# Patient Record
Sex: FEMALE | Race: WHITE | NOT HISPANIC OR LATINO | Employment: UNEMPLOYED | URBAN - NONMETROPOLITAN AREA
[De-identification: names, ages, dates, MRNs, and addresses within clinical notes are randomized per-mention and may not be internally consistent; named-entity substitution may affect disease eponyms.]

---

## 2018-05-14 ENCOUNTER — HOSPITAL ENCOUNTER (INPATIENT)
Facility: HOSPITAL | Age: 23
LOS: 3 days | Discharge: HOME | End: 2018-05-17
Attending: PSYCHIATRY & NEUROLOGY | Admitting: PSYCHIATRY & NEUROLOGY

## 2018-05-14 PROBLEM — R45.851 SUICIDAL IDEATION: Status: ACTIVE | Noted: 2018-05-14

## 2018-05-14 RX ORDER — PANTOPRAZOLE SODIUM 40 MG/1
40 TABLET, DELAYED RELEASE ORAL EVERY MORNING
Status: DISCONTINUED | OUTPATIENT
Start: 2018-05-15 | End: 2018-05-17 | Stop reason: HOSPADM

## 2018-05-14 RX ORDER — ACETAMINOPHEN 325 MG/1
650 TABLET ORAL EVERY 4 HOURS PRN
Status: DISCONTINUED | OUTPATIENT
Start: 2018-05-14 | End: 2018-05-17 | Stop reason: HOSPADM

## 2018-05-14 RX ORDER — CETIRIZINE HYDROCHLORIDE 10 MG/1
10 TABLET ORAL DAILY
Status: DISCONTINUED | OUTPATIENT
Start: 2018-05-14 | End: 2018-05-17 | Stop reason: HOSPADM

## 2018-05-14 RX ORDER — LOPERAMIDE HYDROCHLORIDE 2 MG/1
2 CAPSULE ORAL 4 TIMES DAILY PRN
Status: DISCONTINUED | OUTPATIENT
Start: 2018-05-14 | End: 2018-05-17 | Stop reason: HOSPADM

## 2018-05-14 RX ORDER — DOCUSATE SODIUM 100 MG/1
100 CAPSULE, LIQUID FILLED ORAL 2 TIMES DAILY PRN
Status: DISCONTINUED | OUTPATIENT
Start: 2018-05-14 | End: 2018-05-17 | Stop reason: HOSPADM

## 2018-05-14 RX ORDER — HYDROXYZINE PAMOATE 50 MG/1
50 CAPSULE ORAL EVERY 6 HOURS PRN
Status: DISCONTINUED | OUTPATIENT
Start: 2018-05-14 | End: 2018-05-17 | Stop reason: HOSPADM

## 2018-05-14 RX ORDER — CITALOPRAM 10 MG/1
10 TABLET ORAL DAILY
Status: ON HOLD | COMMUNITY
End: 2018-05-14

## 2018-05-14 RX ORDER — CLONIDINE HYDROCHLORIDE 0.1 MG/1
0.1 TABLET ORAL EVERY 4 HOURS PRN
Status: DISCONTINUED | OUTPATIENT
Start: 2018-05-14 | End: 2018-05-17 | Stop reason: HOSPADM

## 2018-05-14 RX ORDER — NICOTINE 21 MG/24HR
1 PATCH, TRANSDERMAL 24 HOURS TRANSDERMAL EVERY 24 HOURS
Status: DISCONTINUED | OUTPATIENT
Start: 2018-05-14 | End: 2018-05-16

## 2018-05-14 RX ORDER — TRAZODONE HYDROCHLORIDE 50 MG/1
50 TABLET ORAL NIGHTLY PRN
Status: DISCONTINUED | OUTPATIENT
Start: 2018-05-14 | End: 2018-05-15

## 2018-05-14 RX ORDER — LANSOPRAZOLE 15 MG/1
15 CAPSULE, DELAYED RELEASE ORAL DAILY
COMMUNITY
End: 2019-06-27

## 2018-05-14 RX ORDER — METRONIDAZOLE 500 MG/1
500 TABLET ORAL 3 TIMES DAILY
COMMUNITY
End: 2019-06-27

## 2018-05-14 RX ORDER — LORATADINE 10 MG/1
10 CAPSULE, LIQUID FILLED ORAL DAILY
COMMUNITY
End: 2019-06-27

## 2018-05-14 RX ORDER — HYDROXYZINE PAMOATE 25 MG/1
25 CAPSULE ORAL NIGHTLY
COMMUNITY
End: 2018-05-17 | Stop reason: HOSPADM

## 2018-05-14 RX ORDER — PROPRANOLOL HYDROCHLORIDE 10 MG/1
10 TABLET ORAL 2 TIMES DAILY
COMMUNITY
End: 2018-05-17 | Stop reason: HOSPADM

## 2018-05-14 RX ORDER — METRONIDAZOLE 500 MG/1
500 TABLET ORAL 3 TIMES DAILY
Status: DISCONTINUED | OUTPATIENT
Start: 2018-05-14 | End: 2018-05-17 | Stop reason: HOSPADM

## 2018-05-14 RX ORDER — METRONIDAZOLE 500 MG/1
500 TABLET ORAL 3 TIMES DAILY
Status: DISCONTINUED | OUTPATIENT
Start: 2018-05-14 | End: 2018-05-14

## 2018-05-14 RX ORDER — ONDANSETRON 4 MG/1
4 TABLET, FILM COATED ORAL EVERY 6 HOURS PRN
Status: DISCONTINUED | OUTPATIENT
Start: 2018-05-14 | End: 2018-05-17 | Stop reason: HOSPADM

## 2018-05-14 RX ADMIN — CETIRIZINE HYDROCHLORIDE 10 MG: 10 TABLET, FILM COATED ORAL at 18:10

## 2018-05-14 RX ADMIN — METRONIDAZOLE 500 MG: 500 TABLET ORAL at 20:22

## 2018-05-14 NOTE — NURSING NOTE
Patient was transferred from [facility] and stated that she had told the staff at  that she was being treated for C-diff.  Staff at [facility] was not given this information in report or in the paperwork received from them.

## 2018-05-14 NOTE — PLAN OF CARE
Problem: Suicidal Behavior (Adult)  Goal: Suicidal Behavior is Absent/Minimized/Managed  Outcome: Ongoing (interventions implemented as appropriate)      Problem: Mood Impairment (Depressive Signs/Symptoms) (Adult)  Goal: Improved Mood Symptoms (Depressive Signs/Symptoms)  Outcome: Ongoing (interventions implemented as appropriate)      Problem: Mood Impairment (Anxiety Signs/Symptoms) (Adult)  Goal: Improved Mood Symptoms (Anxiety Signs/Symptoms)  Outcome: Ongoing (interventions implemented as appropriate)      Problem: Patient Care Overview  Goal: Plan of Care Review  Outcome: Ongoing (interventions implemented as appropriate)    Goal: Individualization and Mutuality  Outcome: Ongoing (interventions implemented as appropriate)    Goal: Discharge Needs Assessment  Outcome: Ongoing (interventions implemented as appropriate)    Goal: Interprofessional Rounds/Family Conf  Outcome: Ongoing (interventions implemented as appropriate)      Problem: Overarching Goals (Adult)  Goal: Adheres to Safety Considerations for Self and Others  Outcome: Ongoing (interventions implemented as appropriate)    Goal: Optimized Coping Skills in Response to Life Stressors  Outcome: Ongoing (interventions implemented as appropriate)    Goal: Develops/Participates in Therapeutic [place] to Support Successful Transition  Outcome: Ongoing (interventions implemented as appropriate)

## 2018-05-14 NOTE — NURSING NOTE
Patient arrived via stretcher from [facility] escorted to exam room for initial admission process by security and staff at 1502.  Diagnosis of MDD and suicide attempt by OD of 20 Celexa.    Patient did receive charcoal.  She has flat but calm affect.  She states she continues to have suicidal thoughts with plan to OD.  She states that she has had a lot of medical issues, lost her job around November of last year, having financial problems and bought a house that needs a lot of fixing up.  Patient is depressed due to her life stressors and has felt very anxious on how to handle the problems in her life.  She stated that yesterday she was having chest pain due to the anxiety.  Patient also has other dx of C-Diff which she has been receiving treatment for and prescription should be completed tomorrow, anemia, IBS, and anemia.  Explained unit routine to patient.  Initiated care plans.  Will monitor as ordered to maintain patient safety.

## 2018-05-14 NOTE — NURSING NOTE
Patient informed to have home meds of Linzess and Birth control pill brought in.  Patient states her mom can bring these meds in.

## 2018-05-14 NOTE — NURSING NOTE
Patient educated regarding C-Diff precautions.  Patient will need to wash her hands with soap and water prior to leaving room and chose the same chair each time when going to groups and meals.  Patient notified of why staff would wear gowns and gloves in room beyond tape.  Patient verbalized understanding.

## 2018-05-14 NOTE — NURSING NOTE
Dr Dobson ROS         General  Recent weight change, Fatigue and headaches    Eyes   glasses/contact lens    ENT/Mouth   None    Cardio   Chest pain    Resp   None    GI    Appetite , Nausea/vomiting, Frequent diarrhea, Constipation and Heart burn       None    MS    None    Skin/Hair/Nails   None    Neuro   Frequent/recurrent headaches and Light headed/dizzy seizures

## 2018-05-15 PROBLEM — F10.21 ALCOHOL USE DISORDER, SEVERE, IN SUSTAINED REMISSION (HCC): Status: ACTIVE | Noted: 2018-05-15

## 2018-05-15 PROBLEM — F12.20 CANNABIS USE DISORDER, MODERATE, DEPENDENCE: Status: ACTIVE | Noted: 2018-05-15

## 2018-05-15 PROBLEM — F33.2 SEVERE EPISODE OF RECURRENT MAJOR DEPRESSIVE DISORDER, WITHOUT PSYCHOTIC FEATURES: Status: ACTIVE | Noted: 2018-05-15

## 2018-05-15 PROCEDURE — 90791 PSYCH DIAGNOSTIC EVALUATION: CPT | Performed by: PSYCHIATRY & NEUROLOGY

## 2018-05-15 PROCEDURE — 99232 SBSQ HOSP IP/OBS MODERATE 35: CPT | Performed by: FAMILY MEDICINE

## 2018-05-15 RX ORDER — DULOXETIN HYDROCHLORIDE 60 MG/1
60 CAPSULE, DELAYED RELEASE ORAL DAILY
Status: DISCONTINUED | OUTPATIENT
Start: 2018-05-15 | End: 2018-05-17 | Stop reason: HOSPADM

## 2018-05-15 RX ORDER — MIRTAZAPINE 15 MG/1
15 TABLET, FILM COATED ORAL NIGHTLY
Status: DISCONTINUED | OUTPATIENT
Start: 2018-05-15 | End: 2018-05-17 | Stop reason: HOSPADM

## 2018-05-15 RX ORDER — POTASSIUM CHLORIDE 750 MG/1
20 CAPSULE, EXTENDED RELEASE ORAL
Status: DISPENSED | OUTPATIENT
Start: 2018-05-15 | End: 2018-05-17

## 2018-05-15 RX ADMIN — MIRTAZAPINE 15 MG: 15 TABLET, FILM COATED ORAL at 20:01

## 2018-05-15 RX ADMIN — DULOXETINE 60 MG: 60 CAPSULE, DELAYED RELEASE ORAL at 13:21

## 2018-05-15 RX ADMIN — METRONIDAZOLE 500 MG: 500 TABLET ORAL at 20:01

## 2018-05-15 RX ADMIN — PANTOPRAZOLE SODIUM 40 MG: 40 TABLET, DELAYED RELEASE ORAL at 08:57

## 2018-05-15 RX ADMIN — POTASSIUM CHLORIDE 20 MEQ: 750 CAPSULE, EXTENDED RELEASE ORAL at 17:07

## 2018-05-15 RX ADMIN — METRONIDAZOLE 500 MG: 500 TABLET ORAL at 17:08

## 2018-05-15 RX ADMIN — CETIRIZINE HYDROCHLORIDE 10 MG: 10 TABLET, FILM COATED ORAL at 08:57

## 2018-05-15 RX ADMIN — METRONIDAZOLE 500 MG: 500 TABLET ORAL at 08:57

## 2018-05-15 RX ADMIN — POTASSIUM CHLORIDE 20 MEQ: 750 CAPSULE, EXTENDED RELEASE ORAL at 09:03

## 2018-05-15 NOTE — NURSING NOTE
Behavior   Anxiety: Feeling worried  Depression: depressed mood  Pain  4  AVH   no  S/I   no  H/I   no    Affect   calm and pleasant and flat    Note: patient is in her room, she is not c/o SI/HI.  She states is friendly. She states her throat and ear hurt.  She reports that she is not employed now due to missing so much work with the stomach problems.    She is still on precautions due to C-diff.        Intervention  Instructed in medication usage and effects  Medications administered as ordered  Encouraged to verbalize needs      Response  Verbalized understanding   Did patient take medications as ordered yes   Did patient interact with assessment?  yes     Plan  Will monitor for safety  Will monitor every 15 minutes as ordered  Will evaluate and promote the plan of care
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
## 2018-05-15 NOTE — NURSING NOTE
Behavior   Anxiety: Feeling anxious  Depression: depressed mood, suicidal thoughts with specific plan and loss of energy/fatigue  Pain   0  AVH   no  S/I   yes   H/I   no  Affect   Calm, flat    Patient was laying in bed awake during med pass. Patient had flat affect and appeared unkempt. Patient would not engage in conversation. Patient appeared irritated to speak with RN. Patient denies any physical discomfort or diarrhea. Snack provided, will continue to monitor.     Intervention  Medications reviewed and administered  Assessment complete    Response  Verbalized understanding   Took medications  Interacted with assessment    Plan  Will promote and reinforce current treatment plan and encourage involvement in care plan goals.   Will provide for safe, calm, quiet environment.  Will promote open communication with staff and foster a trusting/working relationship with patient.   Will promote participation in groups and therapies and independent reflection.

## 2018-05-15 NOTE — PLAN OF CARE
Problem: Suicidal Behavior (Adult)  Goal: Suicidal Behavior is Absent/Minimized/Managed  Outcome: Ongoing (interventions implemented as appropriate)      Problem: Mood Impairment (Depressive Signs/Symptoms) (Adult)  Goal: Improved Mood Symptoms (Depressive Signs/Symptoms)  Outcome: Ongoing (interventions implemented as appropriate)      Problem: Mood Impairment (Anxiety Signs/Symptoms) (Adult)  Goal: Improved Mood Symptoms (Anxiety Signs/Symptoms)  Outcome: Ongoing (interventions implemented as appropriate)      Problem: Patient Care Overview  Goal: Discharge Needs Assessment  Outcome: Ongoing (interventions implemented as appropriate)    Goal: Interprofessional Rounds/Family Conf  Outcome: Ongoing (interventions implemented as appropriate)      Problem: Overarching Goals (Adult)  Goal: Adheres to Safety Considerations for Self and Others  Outcome: Ongoing (interventions implemented as appropriate)    Goal: Optimized Coping Skills in Response to Life Stressors  Outcome: Ongoing (interventions implemented as appropriate)    Goal: Develops/Participates in Therapeutic [place] to Support Successful Transition  Outcome: Ongoing (interventions implemented as appropriate)
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
## 2018-05-15 NOTE — CONSULTS
CHIEF COMPLAINT/REASON FOR VISIT:  Suicide Attempt    HPI:  Patient presented to the [facility] emergency room on May 14 at apparently about 4 AM.  sHe reported she took 20 tablets of duloxetine 60 mg about 45 minutes prior to presentation and there was a suicide note left.  There are no other notes available to us after the initial intake in the emergency room but apparently she was felt to be medically stable and was transferred to the behavioral health unit here.    PROBLEM LIST:  Patient Active Problem List    Diagnosis   • Suicidal ideation [R45.851]         CURRENT MEDICATIONS:  Prescriptions Prior to Admission   Medication Sig Dispense Refill Last Dose   • hydrOXYzine (VISTARIL) 25 MG capsule Take 25 mg by mouth Every Night.   5/13/2018 at 2100   • lansoprazole (PREVACID) 15 MG capsule Take 15 mg by mouth Daily.   5/13/2018 at 0800   • linaclotide (LINZESS) 290 MCG capsule capsule Take 72 mcg by mouth Every Morning Before Breakfast.   5/13/2018 at 0800   • Loratadine (CLARITIN) 10 MG capsule Take 10 mg by mouth Daily.   5/13/2018 at 0800   • metroNIDAZOLE (FLAGYL) 500 MG tablet Take 500 mg by mouth 3 (Three) Times a Day.   5/13/2018 at 1400   • Norgestimate-Eth Estradiol (SPRINTEC 28 PO) Take  by mouth.   Past Week at 2100   • propranolol (INDERAL) 10 MG tablet Take 10 mg by mouth 2 (Two) Times a Day.   5/13/2018 at 0800   On the medication list as presenting to the emergency room she was on citalopram 20 mg daily    ALLERGIES:  Milk-related compounds; Dayquil [pseudoephedrine-apap-dm]; and Nyquil multi-symptom [pseudoeph-doxylamine-dm-apap]      PAST MEDICAL/SURGICAL HISTORY:  Past Medical History:   Diagnosis Date   • Anxiety    • Depression    • Suicide attempt    History of C. difficile with diarrhea resolved.  She says she has 2 more days remaining of her Flagyl oral treatment.   irritable bowel syndrome  Migraine  GERD    History reviewed. No pertinent surgical history.    Review of  Systems   Constitutional: Positive for fatigue. Negative for activity change, appetite change and fever.   HENT: Positive for ear pain and sore throat. Negative for congestion, ear discharge, facial swelling, hearing loss, nosebleeds, postnasal drip, rhinorrhea, sinus pressure, tinnitus and trouble swallowing.    Eyes: Negative for pain, discharge and visual disturbance.   Respiratory: Negative for cough, shortness of breath and wheezing.    Cardiovascular: Positive for chest pain. Negative for palpitations and leg swelling.   Gastrointestinal: Positive for abdominal pain, constipation and diarrhea. Negative for blood in stool, nausea and vomiting.   Genitourinary: Negative for difficulty urinating, dyspareunia, dysuria, flank pain, frequency, hematuria, menstrual problem, pelvic pain, urgency, vaginal bleeding and vaginal discharge.   Musculoskeletal: Negative for arthralgias, back pain, joint swelling, myalgias and neck pain.   Skin: Negative for rash and wound.   Neurological: Positive for headaches. Negative for dizziness, seizures, syncope, weakness and light-headedness.   Hematological: Negative for adenopathy.       Social History     Social History   • Marital status: Single     Spouse name: N/A   • Number of children: N/A   • Years of education: N/A     Occupational History   • Not on file.     Social History Main Topics   • Smoking status: Current Every Day Smoker     Start date: 4/14/2018   • Smokeless tobacco: Current User      Comment: past month only   • Alcohol use 0.6 oz/week     1 Shots of liquor per week      Comment: one time per month   • Drug use:      Types: Marijuana   • Sexual activity: Yes     Partners: Male     Birth control/ protection: Other      Comment: birth control pills     Other Topics Concern   • Not on file     Social History Narrative   • No narrative on file       Family History   Problem Relation Age of Onset   • Anxiety disorder Mother    • Drug abuse Father    • ADD / ADHD  "Brother    • ADD / ADHD Maternal Uncle    • Suicide Attempts Maternal Uncle    • ADD / ADHD Paternal Uncle    • Depression Maternal Grandfather    • Dementia Maternal Grandfather    • Drug abuse Maternal Grandfather    • Suicide Attempts Maternal Grandfather              Objective     /92 (BP Location: Right arm, Patient Position: Lying) Comment: RNs on duty notified  Pulse 98   Temp 96.7 °F (35.9 °C) (Tympanic)   Resp 18   Ht 167.6 cm (66\")   Wt 100 kg (221 lb 8 oz)   LMP 05/11/2018 (Exact Date)   SpO2 97%   Breastfeeding? No   BMI 35.75 kg/m²     Physical Exam   Constitutional: She appears well-developed and well-nourished.   HENT:   Head: Normocephalic and atraumatic.   Mouth/Throat: Oropharynx is clear and moist. No oropharyngeal exudate.   Patient is 2+ tender over the right TM joint and it is painful for her to open her jaw.  There is no pharyngeal erythema whatsoever and no cervical lymphadenopathy.   Eyes: Conjunctivae and EOM are normal.   Neck: Normal range of motion. Neck supple. No thyromegaly present.   Cardiovascular: Normal rate, regular rhythm and normal heart sounds.  Exam reveals no gallop and no friction rub.    No murmur heard.  Pulmonary/Chest: Effort normal and breath sounds normal. No respiratory distress. She has no wheezes. She has no rales.   Abdominal: Soft. She exhibits no distension and no mass. There is no tenderness. There is no rebound and no guarding.   Musculoskeletal: Normal range of motion.   Lymphadenopathy:     She has no cervical adenopathy.   Neurological: She is alert. She has normal strength and normal reflexes. She displays no tremor and normal reflexes. No cranial nerve deficit or sensory deficit. She exhibits normal muscle tone. Coordination normal. She displays no Babinski's sign on the right side. She displays no Babinski's sign on the left side.   Reflex Scores:       Tricep reflexes are 2+ on the right side and 2+ on the left side.       Bicep reflexes " are 2+ on the right side and 2+ on the left side.       Brachioradialis reflexes are 2+ on the right side and 2+ on the left side.       Patellar reflexes are 2+ on the right side and 2+ on the left side.       Achilles reflexes are 2+ on the right side and 2+ on the left side.  Skin: Skin is warm and dry. No rash noted. No erythema.   Nursing note and vitals reviewed.      Dystonia/Tardive Dyskinesia  Absent  Meningeal Signs  Absent    Diagnostic Studies  From [facility] emergency room:  Acetaminophen less than 10 and alcohol less than 10.  CBC all normal, CMP normal except potassium of 3.4.  Urine drug screen is all negative, and urine pregnancy test is negative.  Salicylates are less than 3.    EKG done on May 14 at almost 2 AM shows:  P-R 136  and heart rate 76.  Sinus rhythm no other abnormalities.    EKG done May 14 at 6 AM shows heart rate 81 P-R 140 CD4 45 normal sinus rhythm minor nonspecific T wave changes in the inferior leads     Patient Active Problem List    Diagnosis   • Suicidal ideation [R43.046]   History of C. difficile with diarrhea resolved.  She says she has 2 more days remaining of her Flagyl oral treatment.   irritable bowel syndrome  Migraine  GERD    Current recommendations on isolation with C. difficile are that once the patient is 48 hours without diarrhea no further isolation is needed.  Patient says she had stopped the diarrhea for at least 48 hours until she was given charcoal and laxative and had 1 loose bowel movement of the charcoal yesterday.  If no diarrhea today would suggest checking with infection control and we can stop the isolation probably.  I suggest finishing out the metronidazole dosing as suggested by her PCP.       hypokalemia, mild.  We will replace that orally here and encourage normal diet.      New discomfort by patient appears to be TMJ primarily but she does have a little sore throat that may be allergic or viral.  Suggest treat  symptomatically.        Continue Home Meds as ordered. Mental health and pain issues managed per psychiatry.  Further diagnostic studies or intervention based on hospital course.

## 2018-05-16 LAB
ARTICHOKE IGE QN: 86 MG/DL (ref 1–129)
CHOLEST SERPL-MCNC: 165 MG/DL (ref 0–199)
GLUCOSE P FAST SERPL-MCNC: 89 MG/DL (ref 60–110)
HDLC SERPL-MCNC: 52 MG/DL (ref 60–200)
LDLC/HDLC SERPL: 1.75 {RATIO} (ref 0–3.22)
TRIGL SERPL-MCNC: 111 MG/DL (ref 20–199)

## 2018-05-16 PROCEDURE — 99232 SBSQ HOSP IP/OBS MODERATE 35: CPT | Performed by: PSYCHIATRY & NEUROLOGY

## 2018-05-16 PROCEDURE — 82947 ASSAY GLUCOSE BLOOD QUANT: CPT | Performed by: PSYCHIATRY & NEUROLOGY

## 2018-05-16 PROCEDURE — 80061 LIPID PANEL: CPT | Performed by: PSYCHIATRY & NEUROLOGY

## 2018-05-16 RX ADMIN — CETIRIZINE HYDROCHLORIDE 10 MG: 10 TABLET, FILM COATED ORAL at 08:22

## 2018-05-16 RX ADMIN — POTASSIUM CHLORIDE 20 MEQ: 750 CAPSULE, EXTENDED RELEASE ORAL at 11:48

## 2018-05-16 RX ADMIN — METRONIDAZOLE 500 MG: 500 TABLET ORAL at 20:16

## 2018-05-16 RX ADMIN — DULOXETINE 60 MG: 60 CAPSULE, DELAYED RELEASE ORAL at 08:22

## 2018-05-16 RX ADMIN — METRONIDAZOLE 500 MG: 500 TABLET ORAL at 17:10

## 2018-05-16 RX ADMIN — METRONIDAZOLE 500 MG: 500 TABLET ORAL at 08:21

## 2018-05-16 RX ADMIN — PANTOPRAZOLE SODIUM 40 MG: 40 TABLET, DELAYED RELEASE ORAL at 08:21

## 2018-05-16 RX ADMIN — MIRTAZAPINE 15 MG: 15 TABLET, FILM COATED ORAL at 20:16

## 2018-05-16 RX ADMIN — POTASSIUM CHLORIDE 20 MEQ: 750 CAPSULE, EXTENDED RELEASE ORAL at 08:21

## 2018-05-16 RX ADMIN — POTASSIUM CHLORIDE 20 MEQ: 750 CAPSULE, EXTENDED RELEASE ORAL at 17:11

## 2018-05-16 NOTE — NURSING NOTE
"Behavior   Anxiety: Feeling worried  Depression: anxiety  Pain  0  AVH   no  S/I   no  H/I   no    Affect   calm and pleasant    Note:  Patient states she slept well last night and has a new \"outlook\" on life.  She is alert/oriented, she takes her medications with no problems.  She is smiling and pleasant.  She states she wants to go home and get her a job so that she doesn't get too far behind.       Intervention  Instructed in medication usage and effects  Medications administered as ordered  Encouraged to verbalize needs      Response  Verbalized understanding   Did patient take medications as ordered yes   Did patient interact with assessment?  yes     Plan  Will monitor for safety  Will monitor every 15 minutes as ordered  Will evaluate and promote the plan of care    "

## 2018-05-16 NOTE — NURSING NOTE
"Behavior   Anxiety: Sleep disturbance  Depression: loss of energy/fatigue  Pain   0  AVH   no  S/I   no  H/I   no  Affect   Blunted    Patient reports having felt lonely today. She expressed excitement about possibly being discharged tomorrow. Patient says she is \"better than yesterday\" Snack provided. Needs met.    Intervention  Medications reviewed and administered  Assessment complete    Response  Verbalized understanding   Took medications  Interacted with assessment    Plan  Will promote and reinforce current treatment plan and encourage involvement in care plan goals.   Will provide for safe, calm, quiet environment.  Will promote open communication with staff and foster a trusting/working relationship with patient.   Will promote participation in groups and therapies and independent reflection.        "

## 2018-05-16 NOTE — PLAN OF CARE
Problem: Suicidal Behavior (Adult)  Goal: Suicidal Behavior is Absent/Minimized/Managed  Outcome: Ongoing (interventions implemented as appropriate)      Problem: Mood Impairment (Depressive Signs/Symptoms) (Adult)  Goal: Improved Mood Symptoms (Depressive Signs/Symptoms)  Outcome: Ongoing (interventions implemented as appropriate)      Problem: Mood Impairment (Anxiety Signs/Symptoms) (Adult)  Goal: Improved Mood Symptoms (Anxiety Signs/Symptoms)  Outcome: Ongoing (interventions implemented as appropriate)      Problem: Patient Care Overview  Goal: Discharge Needs Assessment  Outcome: Ongoing (interventions implemented as appropriate)    Goal: Interprofessional Rounds/Family Conf  Outcome: Ongoing (interventions implemented as appropriate)      Problem: Overarching Goals (Adult)  Goal: Adheres to Safety Considerations for Self and Others  Outcome: Ongoing (interventions implemented as appropriate)    Goal: Optimized Coping Skills in Response to Life Stressors  Outcome: Ongoing (interventions implemented as appropriate)    Goal: Develops/Participates in Therapeutic [place] to Support Successful Transition  Outcome: Ongoing (interventions implemented as appropriate)

## 2018-05-16 NOTE — PROGRESS NOTES
5/16/2018    Chief Complaint: suicide attempt    Subjective:  Patient is a 22 y.o. female who was hospitalized for suicide attempt.   Since yesterday the patient has been feeling better. She states she is feeling more hopeful this morning. After getting Remeron last night, she was able to get a good nights sleep and feels this has greatly helped her mood. She denies feeling sad or depressed and has no suicidal ideation. States she would like to go home as soon as possible because she misses her mom.     Objective     Vital Signs    Temp:  [96.6 °F (35.9 °C)-96.8 °F (36 °C)] 96.6 °F (35.9 °C)  Heart Rate:  [] 110  Resp:  [18] 18  BP: (115-144)/(65-75) 115/65    Physical Exam:   General Appearance: alert, pleasant, appears stated age and cooperative,  Hygiene:   good  Gait & Station: Normal  Musculoskeletal: No tremors or abnormal involuntary movements    Mental Status Exam:   Cooperation:  Cooperative  Eye Contact:  Good  Psychomotor Behavior:  Appropriate  Mood: Improving  Affect:  normal  Speech:  Normal  Thought Process:  Coherent  Associations: Goal Directed  Thought Content:     Normal   Suicidal:  None   Homicidal:  None   Hallucinations:  None   Delusion:  None  Cognitive Functioning:   Consciousness: awake, alert and oriented  Reliability:  good  Insight:  Good  Judgement:  Good  Impulse Control:  Fair    Lab Results (last 24 hours)     Procedure Component Value Units Date/Time    Lipid Panel [[id number]]  (Abnormal) Collected:  05/16/18 0527    Specimen:  Blood Updated:  05/16/18 0650     Total Cholesterol 165 mg/dL      Triglycerides 111 mg/dL      HDL Cholesterol 52 (L) mg/dL      LDL Cholesterol  86 mg/dL      LDL/HDL Ratio 1.75    Glucose, Fasting [[id number]]  (Normal) Collected:  05/16/18 0527    Specimen:  Blood Updated:  05/16/18 0639     Glucose, Fasting 89 mg/dL         Imaging Results (last 24 hours)     ** No results found for the last 24 hours. **          Medicine:   Current  Facility-Administered Medications:   •  acetaminophen (TYLENOL) tablet 650 mg, 650 mg, Oral, Q4H PRN, Alen Durán MD  •  cetirizine (zyrTEC) tablet 10 mg, 10 mg, Oral, Daily, Alen Durán MD, 10 mg at 05/16/18 0822  •  CloNIDine (CATAPRES) tablet 0.1 mg, 0.1 mg, Oral, Q4H PRN, Alen Durán MD  •  docusate sodium (COLACE) capsule 100 mg, 100 mg, Oral, BID PRN, Alen Durán MD  •  DULoxetine (CYMBALTA) DR capsule 60 mg, 60 mg, Oral, Daily, Alen Durán MD, 60 mg at 05/16/18 0822  •  hydrOXYzine (VISTARIL) capsule 50 mg, 50 mg, Oral, Q6H PRN, Alen Durán MD  •  loperamide (IMODIUM) capsule 2 mg, 2 mg, Oral, 4x Daily PRN, Alen Durán MD  •  magnesium hydroxide (MILK OF MAGNESIA) suspension 2400 mg/10mL 10 mL, 10 mL, Oral, Daily PRN, Alen Durán MD  •  metroNIDAZOLE (FLAGYL) tablet 500 mg, 500 mg, Oral, TID, Alen Durán MD, 500 mg at 05/16/18 0821  •  mirtazapine (REMERON) tablet 15 mg, 15 mg, Oral, Nightly, Alen Durán MD, 15 mg at 05/15/18 2001  •  nicotine (NICODERM CQ) 21 MG/24HR patch 1 patch, 1 patch, Transdermal, Q24H, Alen Duárn MD  •  ondansetron (ZOFRAN) tablet 4 mg, 4 mg, Oral, Q6H PRN, Alen Durán MD  •  pantoprazole (PROTONIX) EC tablet 40 mg, 40 mg, Oral, QAM, Alen Durán MD, 40 mg at 05/16/18 0821  •  potassium chloride (MICRO-K) CR capsule 20 mEq, 20 mEq, Oral, TID With Meals, Jonas Dobson MD, 20 mEq at 05/16/18 1148    Diagnoses/Assessment:   Principal Problem:    Severe episode of recurrent major depressive disorder, without psychotic features  Active Problems:    Suicidal ideation    Cannabis use disorder, moderate, dependence    Alcohol use disorder, severe, in sustained remission      Treatment Plan:    1) Will continue care for the patient on the behavioral health unit at [facility] to ensure patient safety.  2) Will continue to provide treatment with the unit milieu, activities, therapies  and psychopharmacological management.  3) Patient to be placed on or continued on  Q15 minute checks  and Suicide precautions.  4) Pertinent medical issues: C. Diff.  She will complete her flagyl by tomorrow morning.  Will continue contact precautions.  5) Will order following labs: none  6) Will make the following medication changes: Will continue the cymbalta and remeron.  7) Will continue discharge planning as appropriate for patient.  Will plan for family session tomorrow.  8) Psychotherapy provided for 16-37 minutes.  Provided CBT and discussed safety planning.    Treatment plan and medication risks and benefits discussed with: Patient    Alen Durán MD  05/16/18  12:19 PM

## 2018-05-16 NOTE — PROGRESS NOTES
Met with patient to complete Recreation Therapy Assessment.  Patient states she has good family support and identifies leisure interest.  She states she recently lost her job due to health problems.  Patient states she would like to learn new coping skills.  Patient will be encouraged to participate in all groups and identify new coping skills.

## 2018-05-16 NOTE — PLAN OF CARE
Problem: Mood Impairment (Depressive Signs/Symptoms) (Adult)  Goal: Improved Mood Symptoms (Depressive Signs/Symptoms)  Outcome: Ongoing (interventions implemented as appropriate)      Problem: Mood Impairment (Anxiety Signs/Symptoms) (Adult)  Goal: Improved Mood Symptoms (Anxiety Signs/Symptoms)  Outcome: Ongoing (interventions implemented as appropriate)

## 2018-05-17 VITALS
DIASTOLIC BLOOD PRESSURE: 83 MMHG | RESPIRATION RATE: 18 BRPM | WEIGHT: 221.5 LBS | TEMPERATURE: 98.2 F | HEIGHT: 66 IN | HEART RATE: 118 BPM | SYSTOLIC BLOOD PRESSURE: 132 MMHG | BODY MASS INDEX: 35.6 KG/M2 | OXYGEN SATURATION: 100 %

## 2018-05-17 PROBLEM — R45.851 SUICIDAL IDEATION: Status: RESOLVED | Noted: 2018-05-14 | Resolved: 2018-05-17

## 2018-05-17 PROCEDURE — 99238 HOSP IP/OBS DSCHRG MGMT 30/<: CPT | Performed by: PSYCHIATRY & NEUROLOGY

## 2018-05-17 RX ORDER — DULOXETIN HYDROCHLORIDE 60 MG/1
60 CAPSULE, DELAYED RELEASE ORAL DAILY
Qty: 30 CAPSULE | Refills: 0 | Status: SHIPPED | OUTPATIENT
Start: 2018-05-18 | End: 2019-06-27

## 2018-05-17 RX ORDER — POTASSIUM CHLORIDE 750 MG/1
20 CAPSULE, EXTENDED RELEASE ORAL
Qty: 2 CAPSULE | Refills: 0 | Status: SHIPPED | OUTPATIENT
Start: 2018-05-17 | End: 2018-05-18

## 2018-05-17 RX ORDER — MIRTAZAPINE 15 MG/1
15 TABLET, FILM COATED ORAL NIGHTLY
Qty: 30 TABLET | Refills: 0 | Status: SHIPPED | OUTPATIENT
Start: 2018-05-17 | End: 2019-06-27

## 2018-05-17 RX ADMIN — METRONIDAZOLE 500 MG: 500 TABLET ORAL at 08:12

## 2018-05-17 RX ADMIN — PANTOPRAZOLE SODIUM 40 MG: 40 TABLET, DELAYED RELEASE ORAL at 08:12

## 2018-05-17 RX ADMIN — CETIRIZINE HYDROCHLORIDE 10 MG: 10 TABLET, FILM COATED ORAL at 08:11

## 2018-05-17 RX ADMIN — DULOXETINE 60 MG: 60 CAPSULE, DELAYED RELEASE ORAL at 08:12

## 2018-05-17 NOTE — PLAN OF CARE
Problem: Suicidal Behavior (Adult)  Goal: Suicidal Behavior is Absent/Minimized/Managed  Outcome: Ongoing (interventions implemented as appropriate)      Problem: Mood Impairment (Depressive Signs/Symptoms) (Adult)  Goal: Improved Mood Symptoms (Depressive Signs/Symptoms)  Outcome: Ongoing (interventions implemented as appropriate)      Problem: Mood Impairment (Anxiety Signs/Symptoms) (Adult)  Goal: Improved Mood Symptoms (Anxiety Signs/Symptoms)  Outcome: Ongoing (interventions implemented as appropriate)      Problem: Patient Care Overview  Goal: Discharge Needs Assessment  Outcome: Ongoing (interventions implemented as appropriate)    Goal: Interprofessional Rounds/Family Conf  Outcome: Ongoing (interventions implemented as appropriate)      Problem: Overarching Goals (Adult)  Goal: Adheres to Safety Considerations for Self and Others  Outcome: Ongoing (interventions implemented as appropriate)    Goal: Optimized Coping Skills in Response to Life Stressors  Outcome: Ongoing (interventions implemented as appropriate)    Goal: Develops/Participates in Therapeutic [place] to Support Successful Transition  Outcome: Ongoing (interventions implemented as appropriate)

## 2018-05-17 NOTE — MEDICAL STUDENT
5/17/2018    Chief Complaint: Prior suicidal ideation    Subjective:  Patient is a 22 y.o. female who was hospitalized for suicidal ideation, anxiety and depression. Since yesterday the patient has been doing well. Patient reports that level of hopefulness is improving.  She states that she is no longer feeling down or depressed and has no suicidal ideation. Upon discharge, she plans to go back to her home with three roommates. She says all but one roommate are supportive of her stay here, and she plans to ask the non-supportive roommate to find another place to live. Her mom lives 7 minutes from her house and she feels having her close by will be helpful. She has thought more about her plan to cope with stressful or triggering situations since yesterday, and she has a list of ways she think she can implement once going home. She denies further episodes of diarrhea and will complete her Flagyl course today.     Objective     Vital Signs    Temp:  [96.6 °F (35.9 °C)-97 °F (36.1 °C)] 97 °F (36.1 °C)  Heart Rate:  [106-110] 106  Resp:  [18] 18  BP: (115-146)/(65-91) 146/91    Physical Exam:   General Appearance: alert, pleasant, appears stated age and cooperative,  Hygiene:   good  Gait & Station: Normal  Musculoskeletal: No tremors or abnormal involuntary movements    Mental Status Exam:   Cooperation:  Cooperative  Eye Contact:  Good  Psychomotor Behavior:  Appropriate  Mood: Improving  Affect:  normal  Speech:  Normal  Thought Process:  Coherent  Associations: Goal Directed  Thought Content:     Normal   Suicidal:  None   Homicidal:  None   Hallucinations:  None   Delusion:  None  Cognitive Functioning:   Consciousness: awake and alert and Orientation:  Person, Place, Time and Situation  Reliability:  fair  Insight:  Good  Judgement:  Fair  Impulse Control:  Fair    Lab Results (last 24 hours)     ** No results found for the last 24 hours. **        Imaging Results (last 24 hours)     ** No results found for the  last 24 hours. **          Medicine:   Current Facility-Administered Medications:   •  acetaminophen (TYLENOL) tablet 650 mg, 650 mg, Oral, Q4H PRN, Alen Durán MD  •  cetirizine (zyrTEC) tablet 10 mg, 10 mg, Oral, Daily, Alen Durán MD, 10 mg at 05/17/18 0811  •  CloNIDine (CATAPRES) tablet 0.1 mg, 0.1 mg, Oral, Q4H PRN, Alen Durán MD  •  docusate sodium (COLACE) capsule 100 mg, 100 mg, Oral, BID PRN, Alen Durán MD  •  DULoxetine (CYMBALTA) DR capsule 60 mg, 60 mg, Oral, Daily, Alen Durán MD, 60 mg at 05/17/18 0812  •  hydrOXYzine (VISTARIL) capsule 50 mg, 50 mg, Oral, Q6H PRN, Alen Durán MD  •  loperamide (IMODIUM) capsule 2 mg, 2 mg, Oral, 4x Daily PRN, Alen Durán MD  •  magnesium hydroxide (MILK OF MAGNESIA) suspension 2400 mg/10mL 10 mL, 10 mL, Oral, Daily PRN, Alen Durán MD  •  metroNIDAZOLE (FLAGYL) tablet 500 mg, 500 mg, Oral, TID, Alen Durán MD, 500 mg at 05/17/18 0812  •  mirtazapine (REMERON) tablet 15 mg, 15 mg, Oral, Nightly, Alen Durán MD, 15 mg at 05/16/18 2016  •  ondansetron (ZOFRAN) tablet 4 mg, 4 mg, Oral, Q6H PRN, Alen Durán MD  •  pantoprazole (PROTONIX) EC tablet 40 mg, 40 mg, Oral, QAM, Alen Durán MD, 40 mg at 05/17/18 0812    Diagnoses/Assessment:   Principal Problem:    Severe episode of recurrent major depressive disorder, without psychotic features  Active Problems:    Suicidal ideation    Cannabis use disorder, moderate, dependence    Alcohol use disorder, severe, in sustained remission      Treatment Plan:    1) Will continue care for the patient on the behavioral health unit at [facility] to ensure patient safety.  2) Will continue to provide treatment with the unit milieu, activities, therapies and psychopharmacological management.  3) Patient to be placed on or continued on  Q15 minute checks  and suicide precautions.  4) Pertinent medical issues: C diff. Today is last day of  treatment with Flagyl. Continue contact precautions until discharge.   5) Will order following labs: None  6) Will make the following medication changes: Will continue cymbalta and remeron. Discharge with these medications.   7) Will continue discharge planning as appropriate for patient. Plan for family session this afternoon with possible discharge after.   8) Psychotherapy provided for less than 16 minutes.    Treatment plan and medication risks and benefits discussed with: Patient    Noy Cabrera  05/17/18  8:26 AM

## 2018-05-17 NOTE — DISCHARGE INSTRUCTIONS
Go to emergency room for any medical emergencies    For questions regarding your stay call [phone]    Regular diet    Activities as tolerated

## 2018-05-17 NOTE — NURSING NOTE
Patient ambulated from [facility] for discharge home with family.  Patient stable with no s/sx of distress.  All discharge paperwork, prescription information and follow up appointments discussed with patient.  Patient verbalized understanding.  All paperwork signed.  Home med and personal belongings returned to patient.

## 2018-05-17 NOTE — DISCHARGE SUMMARY
"Admission Date: 5/14/2018    Discharge Date: 05/17/18    Psychiatric History: Patient is a 22 y.o. female who presents with suicide attempt. Onset of symptoms was abrupt starting 2 days ago.  Symptoms have been present on an constant basis. Symptoms are associated with anxiety, insomnia and depressed mood.  Symptoms are aggravated by recent struggle with C. Diff.   Patient's symptoms occur in the context of recent job loss and concerns about affording her home.     She notes feeling very depressed anid she was contemplating suicide. She had a bottle of vistaril and she took all of it. There were twenty tablets. She was texting with a friend and the friend called for help.     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 She contracted C. Diff. As a result of the illness she was not able to work and lost her job. She notes 2-3 episodes of passing out. She was seen in the ED and told she was dehydrated.     She had bought a home about two months and she was doing repairs on it. She is concerned about the mortgage. She has roommates that are helping.     She is on cymbalta twice per day for depression and anxiety. She feels it has not been hrorkrng since she started the flagyl.  She notes an episode of yelling at her stepdad and that was out of character.     She denies any sign and symptoms at lionel.  CSW spoke with pt's mother, with her consent, who stated that pt does have long hx of mood instability, stating \"she can change moods in a matter of seconds\" often becoming easily agitated with no clear trigger.     Psychiatric Review Of Systems:  Pertinent items are noted in HPI.     History  Past psychiatric history:     Psychiatric Hospitalizations: Patient has had no prior hospitalizations.     Suicide Attempts: Patient has had no prior suicide attempts.  Pt, however, does report contemplating suicide and developing a plan in November of last year due to " relationship issues and job loss.      Prior Treatment and Medications Tried: she notes starting treatment at the end of 2016. She has been on celexa and now cymbalta. Celexa did not help even after a couple of months.     History of violence or legal issues: The patient has no significant history of legal issues.       Diagnostic Data:    Recent Results (from the past 168 hour(s))   Glucose, Fasting    Collection Time: 05/16/18  5:27 AM   Result Value Ref Range    Glucose, Fasting 89 60 - 110 mg/dL   Lipid Panel    Collection Time: 05/16/18  5:27 AM   Result Value Ref Range    Total Cholesterol 165 0 - 199 mg/dL    Triglycerides 111 20 - 199 mg/dL    HDL Cholesterol 52 (L) 60 - 200 mg/dL    LDL Cholesterol  86 1 - 129 mg/dL    LDL/HDL Ratio 1.75 0.00 - 3.22     No results found.    Summary of Hospital Course:  Patient was admitted to the behavioral health unit at [facility] to ensure patient safety.  Patient was provided treatment with the unit milieu, activities, therapies and psychopharmacological management.  Patient was placed on Q15 minute checks and Suicide.  Dr. Dobson was consulted for management of medical co-morbidities.  She was found to have C. Diff dx after admission.  She was placed on contact isolation during the hospital stay.  She was continued on the flagyl that she was taking.  She was at the tail end of the treatment.  She had no diarrhea during the hospital stay.  Patient was restarted on the following psychiatric medications: Restarted cymbalta at 60mg daily.  The following medication changes were made during the hospital stay: Augmented with remeron 15mg qhs for insomnia and depression.  Patient's mood and affect improved and she had resolution of suicidal thoughts.  Patient had improvement over the course of the hospital stay and tolerated his medications.  Patient had family session with mother facilitated by the CSW.  Substance abuse issues were present.  Patient had some  THC use but she had been sober from ETOH for almost a year.    Patients Condition at Discharge:  Patient is stable for discharge and is not an imminent threat to self or others.  The patient's behavrior was Appropriate.  Patient reported that mood was Euthymic.  Patient's affect was bright.  Patient's thought content was as follows:   Suicidal:  None   Homicidal:  None   Hallucinations:  None   Delusion:  None    Discharge Diagnosis:  Principal Problem:    Severe episode of recurrent major depressive disorder, without psychotic features  Active Problems:    Cannabis use disorder, moderate, dependence    Alcohol use disorder, severe, in sustained remission      Discharge Medications:      Your medication list      START taking these medications      Instructions Last Dose Given Next Dose Due   DULoxetine 60 MG capsule  Commonly known as:  CYMBALTA  Start taking on:  5/18/2018      Take 1 capsule by mouth Daily.       mirtazapine 15 MG tablet  Commonly known as:  REMERON      Take 1 tablet by mouth Every Night.       potassium chloride 10 MEQ CR capsule  Commonly known as:  MICRO-K      Take 2 capsules by mouth 3 (Three) Times a Day With Meals for 1 dose.          CONTINUE taking these medications      Instructions Last Dose Given Next Dose Due   CLARITIN 10 MG capsule  Generic drug:  Loratadine      Take 10 mg by mouth Daily.       lansoprazole 15 MG capsule  Commonly known as:  PREVACID      Take 15 mg by mouth Daily.       linaclotide 290 MCG capsule capsule  Commonly known as:  LINZESS      Take 72 mcg by mouth Every Morning Before Breakfast.       metroNIDAZOLE 500 MG tablet  Commonly known as:  FLAGYL      Take 500 mg by mouth 3 (Three) Times a Day.       SPRINTEC 28 PO      Take  by mouth.          STOP taking these medications    hydrOXYzine 25 MG capsule  Commonly known as:  VISTARIL        propranolol 10 MG tablet  Commonly known as:  INDERAL              Where to Get Your Medications      These medications  were sent to [facility] - [address] AT [facility] [address] - [phone]  - [phone]   [address], [facility] [address]    Phone:  [phone]   · DULoxetine 60 MG capsule  · mirtazapine 15 MG tablet  · potassium chloride 10 MEQ CR capsule         Justification for multiple antipsychotic medications at discharge:  Not Applicable.    Medication for smoking cessation: Patient declines prescriptions of any cessation agents.    Medication for substance abuse: Patient has a substance use diagnosis but there is no FDA indicated medication to treat this diagnosis.    Disposition: Patient was discharged home with self.    Follow-up Information     Callie M. Wells, APRN Follow up.    Specialty:  Family Medicine  Contact information:  [address] [facility] [address] [phone]             [facility]. Go in 1 week(s).    Why:  [facility] of dc    Hours are M-F from 8:30-4pm    Take ID, Ins Card, SS card, and Med Bottles to follow up appt    Call Crisis Hotline as needed at [phone]  Contact information:  [address]   [place], [place]  [phone]                 Psychiatric follow up will be with [facility].  Medical follow up will be with primary care physician.    Time Spent: Less than 30 minutes.    Alen Durán MD  05/17/18  9:54 AM

## 2018-05-17 NOTE — NURSING NOTE
Behavior   Anxiety: none [present  Depression: depressed mood  Pain   0  AVH   no  S/I   no  H/I   no  Affect   calm and pleasant    Patient was sitting with peer in dayroom laughing and talking. Patient reports knowing peer, they had worked at [employer] [facility] [employer] together. Patient reports having a family meeting scheduled for tomorrow and she is looking forward to her discharge. Patient expressed depressed mood about being away from home and her dog. Patient denies any SI and states that she felt it was a good decision to come here. Snack provided, needs met.    Intervention  Medications reviewed and administered  Assessment complete    Response  Verbalized understanding   Took medications  Interacted with assessment    Plan  Will promote and reinforce current treatment plan and encourage involvement in care plan goals.   Will provide for safe, calm, quiet environment.  Will promote open communication with staff and foster a trusting/working relationship with patient.   Will promote participation in groups and therapies and independent reflection.

## 2018-05-17 NOTE — SIGNIFICANT NOTE
"   05/17/18 5782   Family Counseling   Length of Session 45   Participants mother;other (see comments)   Topic Safety/dc Plan   Patient Response CSW met with pt, her mother, and her aunt to review safety/dc plan. Pt presented to the visitation room, casually dressed, alert and oriented x3. Mood was fair, affect was neutral. Pt embraced her mother, hugged her and told her \"thanks for being here.\" Family present and verbalized their support of pt. Pt stated \"since being here, I have had my eyes opened. I am really grateful for things now and I realize that I am going to be okay. I know I made a mistake and I know now that I have a lot of people that love and care about me.\" Pt spent significant amount of time expressing her thoughts and emotions re: events that led to hospitalization. Family also verbalized their thoughts and concerns re: overdose,depression, and impuslive decisions that pt has been making. Pt noted that she feels that she has a hard time expressing how she feels to family and we discussed ways for family to help, such as identifying signs and symptoms. Pt discussed plan to reach out for help from family, friends, or even crisis line if she feels that she needs assistance. CSW provided education on Borderline Personality Disorder and some of the symptoms and behaviors that pt has exhibtied that meet criteria for BPD. CSW discussed resources such as websites that can assist pt and family in understanding BPD and treatment for  BPD. CSW discussed the importance of intense outpatient therapy to help with pts cognitive restructuring. Pt verbalized understanding. CSW and family both expressed concern re: who pt surrounds herself with and encouraged her to surround herself with positive support. Pt was scheduled for follow up at [facility] for therapy, will have med management appt following initial intake. Pt did participate in tx, was med compliant. Pt has good insight, fair judgement. BPRS was completed " upon dc.

## 2019-06-27 ENCOUNTER — OFFICE VISIT (OUTPATIENT)
Dept: FAMILY MEDICINE CLINIC | Facility: CLINIC | Age: 24
End: 2019-06-27

## 2019-06-27 ENCOUNTER — APPOINTMENT (OUTPATIENT)
Dept: LAB | Facility: HOSPITAL | Age: 24
End: 2019-06-27

## 2019-06-27 VITALS
TEMPERATURE: 97.8 F | WEIGHT: 198 LBS | HEART RATE: 90 BPM | OXYGEN SATURATION: 98 % | DIASTOLIC BLOOD PRESSURE: 82 MMHG | SYSTOLIC BLOOD PRESSURE: 138 MMHG | HEIGHT: 66 IN | BODY MASS INDEX: 31.82 KG/M2

## 2019-06-27 DIAGNOSIS — J30.2 SEASONAL ALLERGIES: Primary | ICD-10-CM

## 2019-06-27 DIAGNOSIS — R53.83 OTHER FATIGUE: ICD-10-CM

## 2019-06-27 DIAGNOSIS — R35.0 FREQUENT URINATION: ICD-10-CM

## 2019-06-27 DIAGNOSIS — R20.2 NUMBNESS AND TINGLING: ICD-10-CM

## 2019-06-27 DIAGNOSIS — R20.0 NUMBNESS AND TINGLING: ICD-10-CM

## 2019-06-27 PROCEDURE — 82746 ASSAY OF FOLIC ACID SERUM: CPT | Performed by: NURSE PRACTITIONER

## 2019-06-27 PROCEDURE — 82306 VITAMIN D 25 HYDROXY: CPT | Performed by: NURSE PRACTITIONER

## 2019-06-27 PROCEDURE — 83540 ASSAY OF IRON: CPT | Performed by: NURSE PRACTITIONER

## 2019-06-27 PROCEDURE — 83036 HEMOGLOBIN GLYCOSYLATED A1C: CPT | Performed by: NURSE PRACTITIONER

## 2019-06-27 PROCEDURE — 84466 ASSAY OF TRANSFERRIN: CPT | Performed by: NURSE PRACTITIONER

## 2019-06-27 PROCEDURE — 82607 VITAMIN B-12: CPT | Performed by: NURSE PRACTITIONER

## 2019-06-27 PROCEDURE — 80053 COMPREHEN METABOLIC PANEL: CPT | Performed by: NURSE PRACTITIONER

## 2019-06-27 PROCEDURE — 85025 COMPLETE CBC W/AUTO DIFF WBC: CPT | Performed by: NURSE PRACTITIONER

## 2019-06-27 PROCEDURE — 99203 OFFICE O/P NEW LOW 30 MIN: CPT | Performed by: NURSE PRACTITIONER

## 2019-06-27 RX ORDER — FERROUS SULFATE 325(65) MG
325 TABLET ORAL
COMMUNITY

## 2019-06-27 RX ORDER — LORATADINE 10 MG/1
10 CAPSULE, LIQUID FILLED ORAL DAILY
Qty: 30 EACH | Refills: 2 | Status: SHIPPED | OUTPATIENT
Start: 2019-06-27

## 2019-06-27 RX ORDER — CHOLECALCIFEROL (VITAMIN D3) 25 MCG
TABLET,CHEWABLE ORAL
COMMUNITY

## 2019-06-27 NOTE — PROGRESS NOTES
"Subjective   Esperanza Mehreen Borrego is a 23 y.o. female.     [facility] Visit    PCP: formerly CAROLINA Avery, but does not currently have PCP--currently uninsured and will be 60 days before she gets insurance through her new employer    CC: \"frequent peeing, swelling in feet, numbness, sharp pain, bruising, nausea, etc\"    Presents with multiple complaints.  Has been to [facility] twice the last month for similar symptoms and treated with antibiotics with no relief in symptoms.  Records have been requested.  Reports she has had urine, vaginal cultures and pregnancy tests which have all been negative.  No blood work done recently.  Reports she was diagnosed with anemia in Feb of this year and is taking B12 and iron pills OTC.      C/O frequent numbness/tingling in hands and feet, mostly feet.  Has occasional swelling.  Working on her feet several hours per day.      Strong family history of diabetes and she is concerned.      Also c/o allergy symptoms.  Previously on Claritin and it was beneficial, but she has been out of for awhile.     Chart review shows previous drug use and suicide attempt in 2018.  Denies any current drug use or recent suicidal thoughts.       Urinary Frequency    Chronicity: persistent. The current episode started 1 to 4 weeks ago. The problem has been unchanged. The patient is experiencing no pain. There has been no fever. She is sexually active. There is no history of pyelonephritis. Associated symptoms include frequency and nausea. Pertinent negatives include no chills, discharge, flank pain, hematuria, hesitancy, possible pregnancy, sweats, urgency or vomiting. Treatments tried: treated wt antibiotics at [facility]. The treatment provided no relief. There is no history of recurrent UTIs.   Lower Extremity Issue   This is a new problem. The current episode started in the past 7 days (swelling, numbness/tingling). The problem occurs daily. The problem has been unchanged. Associated symptoms " include congestion, fatigue, myalgias (feet/legs), nausea, numbness (feet -- and tingling) and urinary symptoms (frequency). Pertinent negatives include no abdominal pain, anorexia, arthralgias, change in bowel habit ( history of chronic constipation--taking Linzess which helps), chest pain, chills, coughing, diaphoresis, fever, headaches, joint swelling, neck pain, rash, sore throat, swollen glands, vertigo, visual change, vomiting or weakness. The symptoms are aggravated by walking and standing. Treatments tried: reports she bought more supportive shoes, wearing sandal slides today. The treatment provided no relief.        The following portions of the patient's history were reviewed and updated as appropriate: allergies, current medications, past family history, past medical history, past social history, past surgical history and problem list.    Review of Systems   Constitutional: Positive for fatigue. Negative for chills, diaphoresis and fever.   HENT: Positive for congestion, ear pain (pressure, popping), nosebleeds ( x 1 yesterday), rhinorrhea, sinus pressure and sneezing. Negative for ear discharge and sore throat.    Eyes: Negative for discharge and itching.   Respiratory: Negative for cough, choking, chest tightness, shortness of breath and wheezing.    Cardiovascular: Positive for leg swelling ( bilateral feet). Negative for chest pain and palpitations.   Gastrointestinal: Positive for constipation ( chronic) and nausea. Negative for abdominal pain, anorexia, blood in stool, change in bowel habit ( history of chronic constipation--taking Linzess which helps) and vomiting.   Genitourinary: Positive for frequency. Negative for dysuria, flank pain, hematuria, hesitancy, urgency and vaginal discharge.   Musculoskeletal: Positive for myalgias (feet/legs). Negative for arthralgias, joint swelling and neck pain.   Skin: Positive for bruise ( left foot). Negative for rash.   Neurological: Positive for numbness  "(feet -- and tingling). Negative for dizziness, vertigo, weakness and headache.   Psychiatric/Behavioral: Positive for depressed mood ( at times). Negative for self-injury and suicidal ideas. The patient is nervous/anxious ( at times).         Previously on medications for anxiety and depression, but not taken for awhile     /82 (BP Location: Right arm, Patient Position: Sitting, Cuff Size: Adult)   Pulse 90   Temp 97.8 °F (36.6 °C) (Tympanic)   Ht 167.6 cm (66\")   Wt 89.8 kg (198 lb)   SpO2 98%   BMI 31.96 kg/m²     Objective   Physical Exam   Constitutional: She is oriented to person, place, and time. She appears well-developed and well-nourished. No distress.   HENT:   Head: Normocephalic and atraumatic.   Right Ear: Tympanic membrane and ear canal normal.   Left Ear: Tympanic membrane and ear canal normal.   Nose: Mucosal edema (pale, swollen) and congestion (mild) present. Right sinus exhibits no maxillary sinus tenderness and no frontal sinus tenderness. Left sinus exhibits no maxillary sinus tenderness and no frontal sinus tenderness.   Mouth/Throat: Uvula is midline, oropharynx is clear and moist and mucous membranes are normal.   Eyes: Conjunctivae are normal. Right eye exhibits no discharge. Left eye exhibits no discharge.   Neck: Normal range of motion. Neck supple. No thyromegaly present.   Cardiovascular: Normal rate and regular rhythm.   Pulmonary/Chest: Effort normal and breath sounds normal. She has no wheezes. She has no rales.   Abdominal: Soft. Bowel sounds are normal. There is no tenderness. There is no rebound and no guarding.   Lymphadenopathy:     She has no cervical adenopathy.   Neurological: She is alert and oriented to person, place, and time.   Skin: Skin is warm and dry. Bruising (faint bruise noted to plantar surface left foot) noted. No rash noted.   Psychiatric: She has a normal mood and affect. Her speech is normal and behavior is normal. Thought content normal. Cognition " and memory are normal.   Nursing note and vitals reviewed.    No results found for this or any previous visit (from the past 24 hour(s)).  No Images in the past 120 days found..      Assessment/Plan   Esperanza was seen today for urinary frequency, vaginal discharge, foot swelling and foot pain.    Diagnoses and all orders for this visit:    Seasonal allergies  -     Loratadine (CLARITIN) 10 MG capsule; Take 10 mg by mouth Daily.    Other fatigue  -     CBC Auto Differential  -     Comprehensive metabolic panel  -     Vitamin B12  -     Folate  -     Vitamin D 25 hydroxy  -     Hemoglobin A1c  -     Iron Profile    Frequent urination  -     CBC Auto Differential  -     Comprehensive metabolic panel  -     Vitamin B12  -     Folate  -     Vitamin D 25 hydroxy  -     Hemoglobin A1c  -     Iron Profile    Numbness and tingling  -     CBC Auto Differential  -     Comprehensive metabolic panel  -     Vitamin B12  -     Folate  -     Vitamin D 25 hydroxy  -     Hemoglobin A1c  -     Iron Profile      Restart Claritin for seasonal allergies.  Saline spray as needed.     Labs as above--will call with results.      RTW: 7-1-19--wear supportive shoes    Patient's Body mass index is 31.96 kg/m². BMI is above normal parameters. Recommendations include: referral to primary care.    Needs to establish with PCP as soon as possible for ongoing care/treatment of chronic complaints

## 2019-06-29 LAB
25(OH)D3 SERPL-MCNC: 18.4 NG/ML (ref 30–100)
ALBUMIN SERPL-MCNC: 4.3 G/DL (ref 3.5–5.2)
ALBUMIN/GLOB SERPL: 1.4 G/DL
ALP SERPL-CCNC: 110 U/L (ref 39–117)
ALT SERPL W P-5'-P-CCNC: 21 U/L (ref 1–33)
ANION GAP SERPL CALCULATED.3IONS-SCNC: 10.6 MMOL/L (ref 5–15)
AST SERPL-CCNC: 24 U/L (ref 1–32)
BASOPHILS # BLD AUTO: 0.04 10*3/MM3 (ref 0–0.2)
BASOPHILS NFR BLD AUTO: 0.8 % (ref 0–1.5)
BILIRUB SERPL-MCNC: 0.5 MG/DL (ref 0.2–1.2)
BUN BLD-MCNC: 10 MG/DL (ref 6–20)
BUN/CREAT SERPL: 11.8 (ref 7–25)
CALCIUM SPEC-SCNC: 9.5 MG/DL (ref 8.6–10.5)
CHLORIDE SERPL-SCNC: 104 MMOL/L (ref 98–107)
CO2 SERPL-SCNC: 25.4 MMOL/L (ref 22–29)
CREAT BLD-MCNC: 0.85 MG/DL (ref 0.57–1)
DEPRECATED RDW RBC AUTO: 42.1 FL (ref 37–54)
DIFFERENTIAL METHOD BLD: ABNORMAL
EOSINOPHIL # BLD AUTO: 0.04 10*3/MM3 (ref 0–0.4)
EOSINOPHIL NFR BLD AUTO: 0.8 % (ref 0.3–6.2)
ERYTHROCYTE [DISTWIDTH] IN BLOOD BY AUTOMATED COUNT: 12 % (ref 12.3–15.4)
FOLATE SERPL-MCNC: 10.8 NG/ML (ref 4.78–24.2)
GFR SERPL CREATININE-BSD FRML MDRD: 83 ML/MIN/1.73
GLOBULIN UR ELPH-MCNC: 3 GM/DL
GLUCOSE BLD-MCNC: 87 MG/DL (ref 65–99)
HBA1C MFR BLD: 4.8 % (ref 4.8–5.6)
HCT VFR BLD AUTO: 45.1 % (ref 34–46.6)
HGB BLD-MCNC: 14.8 G/DL (ref 12–15.9)
IMM GRANULOCYTES # BLD AUTO: 0.01 10*3/MM3 (ref 0–0.05)
IMM GRANULOCYTES NFR BLD AUTO: 0.2 % (ref 0–0.5)
IRON 24H UR-MRATE: 93 MCG/DL (ref 37–145)
IRON SATN MFR SERPL: 23 % (ref 20–50)
LYMPHOCYTES # BLD AUTO: 1.71 10*3/MM3 (ref 0.7–3.1)
LYMPHOCYTES NFR BLD AUTO: 32.6 % (ref 19.6–45.3)
MCH RBC QN AUTO: 31.1 PG (ref 26.6–33)
MCHC RBC AUTO-ENTMCNC: 32.8 G/DL (ref 31.5–35.7)
MCV RBC AUTO: 94.7 FL (ref 79–97)
MONOCYTES # BLD AUTO: 0.47 10*3/MM3 (ref 0.1–0.9)
MONOCYTES NFR BLD AUTO: 9 % (ref 5–12)
NEUTROPHILS # BLD AUTO: 2.97 10*3/MM3 (ref 1.7–7)
NEUTROPHILS NFR BLD AUTO: 56.6 % (ref 42.7–76)
NRBC BLD AUTO-RTO: 0 /100 WBC (ref 0–0.2)
PLATELET # BLD AUTO: 186 10*3/MM3 (ref 140–450)
PMV BLD AUTO: 13.2 FL (ref 6–12)
POTASSIUM BLD-SCNC: 4.5 MMOL/L (ref 3.5–5.2)
PROT SERPL-MCNC: 7.3 G/DL (ref 6–8.5)
RBC # BLD AUTO: 4.76 10*6/MM3 (ref 3.77–5.28)
SODIUM BLD-SCNC: 140 MMOL/L (ref 136–145)
TIBC SERPL-MCNC: 399 MCG/DL (ref 298–536)
TRANSFERRIN SERPL-MCNC: 268 MG/DL (ref 200–360)
VIT B12 BLD-MCNC: 694 PG/ML (ref 211–946)
WBC # BLD AUTO: 5.24 10*3/MM3 (ref 3.4–10.8)
WBC NRBC COR # BLD: 5.24 10*3/MM3 (ref 3.4–10.8)

## 2019-07-01 DIAGNOSIS — E55.9 VITAMIN D DEFICIENCY: Primary | ICD-10-CM

## 2019-07-01 RX ORDER — ERGOCALCIFEROL 1.25 MG/1
50000 CAPSULE ORAL WEEKLY
Qty: 4 CAPSULE | Refills: 2 | Status: SHIPPED | OUTPATIENT
Start: 2019-07-01

## 2020-03-09 ENCOUNTER — APPOINTMENT (OUTPATIENT)
Dept: CT IMAGING | Facility: HOSPITAL | Age: 25
End: 2020-03-09

## 2020-03-09 ENCOUNTER — HOSPITAL ENCOUNTER (EMERGENCY)
Facility: HOSPITAL | Age: 25
Discharge: HOME | End: 2020-03-09
Admitting: EMERGENCY MEDICINE

## 2020-03-09 VITALS
HEART RATE: 78 BPM | WEIGHT: 182 LBS | BODY MASS INDEX: 29.25 KG/M2 | TEMPERATURE: 98.7 F | DIASTOLIC BLOOD PRESSURE: 77 MMHG | RESPIRATION RATE: 18 BRPM | HEIGHT: 66 IN | OXYGEN SATURATION: 97 % | SYSTOLIC BLOOD PRESSURE: 127 MMHG

## 2020-03-09 DIAGNOSIS — R56.9 SEIZURE-LIKE ACTIVITY (HCC): Primary | ICD-10-CM

## 2020-03-09 LAB
ALBUMIN SERPL-MCNC: 4.3 G/DL (ref 3.5–5.2)
ALBUMIN/GLOB SERPL: 1.6 G/DL
ALP SERPL-CCNC: 98 U/L (ref 39–117)
ALT SERPL W P-5'-P-CCNC: 13 U/L (ref 1–33)
AMPHET+METHAMPHET UR QL: NEGATIVE
AMPHETAMINES UR QL: NEGATIVE
ANION GAP SERPL CALCULATED.3IONS-SCNC: 11 MMOL/L (ref 5–15)
AST SERPL-CCNC: 15 U/L (ref 1–32)
B-HCG UR QL: NEGATIVE
BARBITURATES UR QL SCN: NEGATIVE
BASOPHILS # BLD AUTO: 0.03 10*3/MM3 (ref 0–0.2)
BASOPHILS NFR BLD AUTO: 0.5 % (ref 0–1.5)
BENZODIAZ UR QL SCN: NEGATIVE
BILIRUB SERPL-MCNC: 0.4 MG/DL (ref 0.2–1.2)
BUN BLD-MCNC: 7 MG/DL (ref 6–20)
BUN/CREAT SERPL: 8.4 (ref 7–25)
BUPRENORPHINE SERPL-MCNC: NEGATIVE NG/ML
CALCIUM SPEC-SCNC: 9.5 MG/DL (ref 8.6–10.5)
CANNABINOIDS SERPL QL: NEGATIVE
CHLORIDE SERPL-SCNC: 105 MMOL/L (ref 98–107)
CO2 SERPL-SCNC: 24 MMOL/L (ref 22–29)
COCAINE UR QL: NEGATIVE
CREAT BLD-MCNC: 0.83 MG/DL (ref 0.57–1)
DEPRECATED RDW RBC AUTO: 40.6 FL (ref 37–54)
EOSINOPHIL # BLD AUTO: 0.06 10*3/MM3 (ref 0–0.4)
EOSINOPHIL NFR BLD AUTO: 1 % (ref 0.3–6.2)
ERYTHROCYTE [DISTWIDTH] IN BLOOD BY AUTOMATED COUNT: 12.4 % (ref 12.3–15.4)
ETHANOL UR QL: <0.01 %
GFR SERPL CREATININE-BSD FRML MDRD: 84 ML/MIN/1.73
GLOBULIN UR ELPH-MCNC: 2.7 GM/DL
GLUCOSE BLD-MCNC: 106 MG/DL (ref 65–99)
HCT VFR BLD AUTO: 41.5 % (ref 34–46.6)
HGB BLD-MCNC: 14.4 G/DL (ref 12–15.9)
HOLD SPECIMEN: NORMAL
HOLD SPECIMEN: NORMAL
IMM GRANULOCYTES # BLD AUTO: 0.01 10*3/MM3 (ref 0–0.05)
IMM GRANULOCYTES NFR BLD AUTO: 0.2 % (ref 0–0.5)
INTERNAL NEGATIVE CONTROL: NEGATIVE
INTERNAL POSITIVE CONTROL: POSITIVE
LYMPHOCYTES # BLD AUTO: 1.9 10*3/MM3 (ref 0.7–3.1)
LYMPHOCYTES NFR BLD AUTO: 32.6 % (ref 19.6–45.3)
Lab: NORMAL
MCH RBC QN AUTO: 31.4 PG (ref 26.6–33)
MCHC RBC AUTO-ENTMCNC: 34.7 G/DL (ref 31.5–35.7)
MCV RBC AUTO: 90.6 FL (ref 79–97)
METHADONE UR QL SCN: NEGATIVE
MONOCYTES # BLD AUTO: 0.38 10*3/MM3 (ref 0.1–0.9)
MONOCYTES NFR BLD AUTO: 6.5 % (ref 5–12)
NEUTROPHILS # BLD AUTO: 3.44 10*3/MM3 (ref 1.7–7)
NEUTROPHILS NFR BLD AUTO: 59.2 % (ref 42.7–76)
NRBC BLD AUTO-RTO: 0 /100 WBC (ref 0–0.2)
OPIATES UR QL: NEGATIVE
OXYCODONE UR QL SCN: NEGATIVE
PCP UR QL SCN: NEGATIVE
PLATELET # BLD AUTO: 220 10*3/MM3 (ref 140–450)
PMV BLD AUTO: 11.4 FL (ref 6–12)
POTASSIUM BLD-SCNC: 4.2 MMOL/L (ref 3.5–5.2)
PROPOXYPH UR QL: NEGATIVE
PROT SERPL-MCNC: 7 G/DL (ref 6–8.5)
RBC # BLD AUTO: 4.58 10*6/MM3 (ref 3.77–5.28)
SODIUM BLD-SCNC: 140 MMOL/L (ref 136–145)
TRICYCLICS UR QL SCN: NEGATIVE
WBC NRBC COR # BLD: 5.82 10*3/MM3 (ref 3.4–10.8)
WHOLE BLOOD HOLD SPECIMEN: NORMAL
WHOLE BLOOD HOLD SPECIMEN: NORMAL

## 2020-03-09 PROCEDURE — 80307 DRUG TEST PRSMV CHEM ANLYZR: CPT | Performed by: NURSE PRACTITIONER

## 2020-03-09 PROCEDURE — 99284 EMERGENCY DEPT VISIT MOD MDM: CPT

## 2020-03-09 PROCEDURE — 80053 COMPREHEN METABOLIC PANEL: CPT

## 2020-03-09 PROCEDURE — 85025 COMPLETE CBC W/AUTO DIFF WBC: CPT

## 2020-03-09 PROCEDURE — 70450 CT HEAD/BRAIN W/O DYE: CPT

## 2020-03-09 PROCEDURE — 81025 URINE PREGNANCY TEST: CPT | Performed by: NURSE PRACTITIONER

## 2020-03-09 RX ORDER — SODIUM CHLORIDE 0.9 % (FLUSH) 0.9 %
10 SYRINGE (ML) INJECTION AS NEEDED
Status: DISCONTINUED | OUTPATIENT
Start: 2020-03-09 | End: 2020-03-09 | Stop reason: HOSPADM

## 2020-03-09 RX ADMIN — SODIUM CHLORIDE 500 ML: 9 INJECTION, SOLUTION INTRAVENOUS at 16:09

## 2020-03-09 NOTE — ED PROVIDER NOTES
"Subjective   Patient is a 24-year-old female that presents to the ER today with complaint of possible seizures.  The patient presents with her significant other who reports that the patient had 3 seizure-like episodes last evening.  He states that the patient reported to him that when she woke up yesterday she felt numb all over her body.  He stated that she had mentioned this several times to him throughout the day.  He states that while she was pain close in the dryer last night she began to fall to the floor.  He did catch her and she did not hit the floor.  He states that he carried her to the living room and put her in the recliner.  He states that while she was in the recliner her legs and arms came contractured.  He states that she did not have any specific generalized shaking episodes.  He states that the patient was trying to talk to him during this episode and was trying to tell him that she could not breathe.  He states that it was not a full sentence but he was able to piece the words together.  The patient tells me that she remembers everything that happened.  She states she remembers trying to tell him that she cannot open her eyes and that she cannot breathe well.  He states that this lasted for several minutes and then occurred again.  He states that he then took the patient upstairs to the bedroom and she had another episode of this as well.  He states that all in all this entire episode lasted approximately 15 minutes.  The patient states that since then she has had a headache.  She denies any recent head injuries or falls.  She denies a previous seizure history in the past.  She states she did not lose control of bowel or bladder during this.  She did not bite her tongue or have any injuries.  The patient mother states that the patient has a stutter several times today which is unusual for the patient.  The patient's significant other reports that the patient had \"normal seizure-like behavior " "after the episode \".  He states that she seemed \"a little out of it \".  She denies any alcohol or illicit drug use.  She presents here today for further evaluation.      History provided by:  Patient   used: No    Altered Mental Status   Presenting symptoms: partial responsiveness    Severity:  Mild  Most recent episode:  Yesterday  Episode history:  Multiple  Duration:  15 minutes  Timing:  Constant  Chronicity:  New  Context: not alcohol use, not dementia, not drug use, not head injury, not homeless, taking medications as prescribed, not nursing home resident and not recent change in medication    Associated symptoms: headaches, seizures and slurred speech    Associated symptoms: no abdominal pain, normal movement, no agitation, no bladder incontinence, no decreased appetite, no depression, no difficulty breathing, no eye deviation, no fever, no hallucinations, no light-headedness, no nausea, no palpitations, no rash, no suicidal behavior, no visual change, no vomiting and no weakness        Review of Systems   Constitutional: Negative for decreased appetite and fever.   Cardiovascular: Negative for palpitations.   Gastrointestinal: Negative for abdominal pain, nausea and vomiting.   Genitourinary: Negative for bladder incontinence.   Skin: Negative for rash.   Neurological: Positive for seizures and headaches. Negative for weakness and light-headedness.   Psychiatric/Behavioral: Negative for agitation and hallucinations.   All other systems reviewed and are negative.      Past Medical History:   Diagnosis Date   • Anxiety    • Depression    • Suicide attempt (CMS/[facility])        Allergies   Allergen Reactions   • Milk-Related Compounds Nausea And Vomiting   • Dayquil [Pseudoephedrine-Apap-Dm] Palpitations   • Niacin Rash     And chest pain   • Nyquil Multi-Symptom [Pseudoeph-Doxylamine-Dm-Apap] Palpitations       No past surgical history on file.    Family History   Problem Relation Age of Onset " "  • Anxiety disorder Mother    • Drug abuse Father    • ADD / ADHD Brother    • ADD / ADHD Maternal Uncle    • Suicide Attempts Maternal Uncle    • ADD / ADHD Paternal Uncle    • Depression Maternal Grandfather    • Dementia Maternal Grandfather    • Drug abuse Maternal Grandfather    • Suicide Attempts Maternal Grandfather        Social History     Socioeconomic History   • Marital status: Single     Spouse name: Not on file   • Number of children: Not on file   • Years of education: Not on file   • Highest education level: Not on file   Tobacco Use   • Smoking status: Former Smoker     Start date: 4/14/2018   • Smokeless tobacco: Never Used   • Tobacco comment: past month only   Substance and Sexual Activity   • Alcohol use: No     Alcohol/week: 1.0 standard drinks     Types: 1 Shots of liquor per week     Frequency: Never     Comment: discontinued   • Drug use: Yes     Types: Marijuana     Comment: Ptient stated she does not use marijuana anymore   • Sexual activity: Yes     Partners: Male     Birth control/protection: Other     Comment: birth control pills   Social History Narrative    Substance Abuse: Alcohol: \"last year I used to drink and party a lot.\" Up to every other day. She stopped that level of drinking around August. She drinks socially now. THC: once a week she will share a blunt. She denies all other drug use.        Marriages: none    Currently single.    Children: none        Education: some college.    Work: recently lost work.    Living situation: her own home with roommates           Objective   Physical Exam   Constitutional: She is oriented to person, place, and time. She appears well-developed and well-nourished.   HENT:   Head: Normocephalic and atraumatic.   Eyes: Pupils are equal, round, and reactive to light. Conjunctivae and EOM are normal.   Neck: Normal range of motion. Neck supple.   Cardiovascular: Normal rate, regular rhythm and normal heart sounds.   Pulmonary/Chest: Effort normal " and breath sounds normal.   Abdominal: Soft. Bowel sounds are normal.   Neurological: She is alert and oriented to person, place, and time.   Skin: Skin is warm and dry. Capillary refill takes less than 2 seconds.   Psychiatric: She has a normal mood and affect.   Nursing note and vitals reviewed.      Procedures           ED Course  ED Course as of Mar 09 1813   Mon Mar 09, 2020   1751 The patient's urine drug screen was negative.  Her CBC shows a normal white blood cell count.  CMP was unremarkable.  The patient's vital signs are stable.    [LF]   1752 CT scan of the head showed no acute findings.    [LF]   1812 I discussed the pt case with Dr. De Anda who is agreeable with plan of care.  I spent a lengthy amount of time discussing the patient's case with the patient, her mother, and significant other.  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  Advised her that she will not be able to drive or participate in any dangerous activities including showering or bathing alone or operating heavy machinery until she is cleared by her primary care provider.  Of asked her to follow with her primary care provider in 1 to 2 days to discuss her symptoms.  The patient does report that she has been under a lot of stress lately and has had previous problems with anxiety in the past.  I did recommend that she again follow-up with primary care provider for further evaluation of this.  At this time she be discharged home in stable condition all give her a work excuse for this week.  She advised return to the ER for any new or worsening symptoms.    [LF]      ED Course User Index  [LF] Cristiane Stein, APRN                                 CT Head Without Contrast   Final Result   1.   No acute intracranial abnormality.   This report was finalized on 03/09/2020 16:53 by Dr. Terri Gan MD.        Labs Reviewed   COMPREHENSIVE METABOLIC PANEL - Abnormal; Notable for the following components:       Result Value    Glucose 106 (*)      All other components within normal limits    Narrative:     GFR Normal >60  Chronic Kidney Disease <60  Kidney Failure <15     CBC WITH AUTO DIFFERENTIAL - Normal   URINE DRUG SCREEN - Normal    Narrative:     Cutoff For Drugs Screened:    Amphetamines               500 ng/ml  Barbiturates               200 ng/ml  Benzodiazepines            150 ng/ml  Cocaine                    150 ng/ml  Methadone                  200 ng/ml  Opiates                    100 ng/ml  Phencyclidine               25 ng/ml  THC                            50 ng/ml  Methamphetamine            500 ng/ml  Tricyclic Antidepressants  300 ng/ml  Oxycodone                  100 ng/ml  Propoxyphene               300 ng/ml  Buprenorphine               10 ng/ml    The normal value for all drugs tested is negative. This report includes unconfirmed screening results, with the cutoff values listed, to be used for medical treatment purposes only.  Unconfirmed results must not be used for non-medical purposes such as employment or legal testing.  Clinical consideration should be applied to any drug of abuse test, particularly when unconfirmed results are used.     POCT PEFORM URINE PREGNANCY - Normal   RAINBOW DRAW    Narrative:     The following orders were created for panel order [place] Draw.  Procedure                               Abnormality         Status                     ---------                               -----------         ------                     Light Blue Top[[id number]]                                   Final result               Green Top (Gel)[[id number]]                                  Final result               Lavender Top[[id number]]                                     Final result               Red Top[[id number]]                                          Final result                 Please view results for these tests on the individual orders.   ETHANOL    Narrative:     Not for legal purposes. Chain of Custody not followed.     POCT GLUCOSE FINGERSTICK   CBC AND DIFFERENTIAL    Narrative:     The following orders were created for panel order CBC & Differential.  Procedure                               Abnormality         Status                     ---------                               -----------         ------                     CBC Auto Differential[[id number]]        Normal              Final result                 Please view results for these tests on the individual orders.   LIGHT BLUE TOP   GREEN TOP   LAVENDER TOP   RED TOP               MDM  Number of Diagnoses or Management Options  Seizure-like activity (CMS/HCC): new and requires workup     Amount and/or Complexity of Data Reviewed  Clinical lab tests: ordered and reviewed  Tests in the radiology section of CPT®: ordered and reviewed  Discuss the patient with other providers: yes    Patient Progress  Patient progress: stable      Final diagnoses:   Seizure-like activity (CMS/HCC)            Cristiane Stein, APRN  03/09/20 1811

## 2020-07-07 ENCOUNTER — LAB REQUISITION (OUTPATIENT)
Dept: LAB | Facility: HOSPITAL | Age: 25
End: 2020-07-07

## 2020-07-07 DIAGNOSIS — Z00.00 ENCOUNTER FOR GENERAL ADULT MEDICAL EXAMINATION WITHOUT ABNORMAL FINDINGS: ICD-10-CM

## 2020-07-07 LAB — SARS-COV-2 RDRP RESP QL NAA+PROBE: NOT DETECTED

## 2020-07-07 PROCEDURE — 87635 SARS-COV-2 COVID-19 AMP PRB: CPT | Performed by: INTERNAL MEDICINE

## 2020-10-25 ENCOUNTER — HOSPITAL ENCOUNTER (EMERGENCY)
Facility: HOSPITAL | Age: 25
Discharge: HOME | End: 2020-10-25
Attending: FAMILY MEDICINE | Admitting: FAMILY MEDICINE

## 2020-10-25 ENCOUNTER — APPOINTMENT (OUTPATIENT)
Dept: GENERAL RADIOLOGY | Facility: HOSPITAL | Age: 25
End: 2020-10-25

## 2020-10-25 VITALS
HEART RATE: 80 BPM | SYSTOLIC BLOOD PRESSURE: 140 MMHG | OXYGEN SATURATION: 98 % | RESPIRATION RATE: 18 BRPM | DIASTOLIC BLOOD PRESSURE: 81 MMHG | HEIGHT: 66 IN | TEMPERATURE: 98.3 F | BODY MASS INDEX: 28.93 KG/M2 | WEIGHT: 180 LBS

## 2020-10-25 DIAGNOSIS — M79.5 FOREIGN BODY (FB) IN SOFT TISSUE: ICD-10-CM

## 2020-10-25 DIAGNOSIS — S61.511D LACERATION OF RIGHT WRIST, SUBSEQUENT ENCOUNTER: Primary | ICD-10-CM

## 2020-10-25 PROCEDURE — 96372 THER/PROPH/DIAG INJ SC/IM: CPT

## 2020-10-25 PROCEDURE — 99283 EMERGENCY DEPT VISIT LOW MDM: CPT

## 2020-10-25 PROCEDURE — 73110 X-RAY EXAM OF WRIST: CPT

## 2020-10-25 PROCEDURE — 25010000003 CEFAZOLIN PER 500 MG: Performed by: NURSE PRACTITIONER

## 2020-10-25 PROCEDURE — 99203 OFFICE O/P NEW LOW 30 MIN: CPT | Performed by: ORTHOPAEDIC SURGERY

## 2020-10-25 RX ORDER — LIDOCAINE HYDROCHLORIDE 10 MG/ML
10 INJECTION, SOLUTION EPIDURAL; INFILTRATION; INTRACAUDAL; PERINEURAL ONCE
Status: COMPLETED | OUTPATIENT
Start: 2020-10-25 | End: 2020-10-25

## 2020-10-25 RX ORDER — AMOXICILLIN AND CLAVULANATE POTASSIUM 875; 125 MG/1; MG/1
1 TABLET, FILM COATED ORAL 2 TIMES DAILY
Qty: 14 TABLET | Refills: 0 | Status: SHIPPED | OUTPATIENT
Start: 2020-10-25 | End: 2020-11-01

## 2020-10-25 RX ORDER — DIAPER,BRIEF,INFANT-TODD,DISP
EACH MISCELLANEOUS EVERY 12 HOURS SCHEDULED
Status: DISCONTINUED | OUTPATIENT
Start: 2020-10-25 | End: 2020-10-25 | Stop reason: HOSPADM

## 2020-10-25 RX ORDER — CEFAZOLIN SODIUM 1 G/3ML
2 INJECTION, POWDER, FOR SOLUTION INTRAMUSCULAR; INTRAVENOUS ONCE
Status: COMPLETED | OUTPATIENT
Start: 2020-10-25 | End: 2020-10-25

## 2020-10-25 RX ORDER — BUPIVACAINE HCL/0.9 % NACL/PF 0.1 %
2 PLASTIC BAG, INJECTION (ML) EPIDURAL ONCE
Status: DISCONTINUED | OUTPATIENT
Start: 2020-10-25 | End: 2020-10-25

## 2020-10-25 RX ADMIN — BACITRACIN: 500 OINTMENT TOPICAL at 20:21

## 2020-10-25 RX ADMIN — CEFAZOLIN SODIUM 2 G: 1 INJECTION, POWDER, FOR SOLUTION INTRAMUSCULAR; INTRAVENOUS at 20:20

## 2020-10-25 RX ADMIN — LIDOCAINE HYDROCHLORIDE 10 ML: 10 INJECTION, SOLUTION EPIDURAL; INFILTRATION; INTRACAUDAL; PERINEURAL at 20:22

## 2021-06-22 PROCEDURE — 88305 TISSUE EXAM BY PATHOLOGIST: CPT | Performed by: GENERAL PRACTICE

## 2021-06-23 ENCOUNTER — LAB REQUISITION (OUTPATIENT)
Dept: LAB | Facility: HOSPITAL | Age: 26
End: 2021-06-23

## 2021-06-23 DIAGNOSIS — Z00.00 ENCOUNTER FOR GENERAL ADULT MEDICAL EXAMINATION WITHOUT ABNORMAL FINDINGS: ICD-10-CM

## 2021-06-24 LAB
CYTO UR: NORMAL
LAB AP CASE REPORT: NORMAL
LAB AP CLINICAL INFORMATION: NORMAL
PATH REPORT.FINAL DX SPEC: NORMAL
PATH REPORT.GROSS SPEC: NORMAL

## 2023-05-25 ENCOUNTER — HOSPITAL ENCOUNTER (OUTPATIENT)
Dept: ULTRASOUND IMAGING | Facility: HOSPITAL | Age: 28
Discharge: HOME | End: 2023-05-25
Payer: OTHER MISCELLANEOUS

## 2023-05-25 ENCOUNTER — TRANSCRIBE ORDERS (OUTPATIENT)
Dept: LAB | Facility: HOSPITAL | Age: 28
End: 2023-05-25

## 2023-05-25 ENCOUNTER — LAB (OUTPATIENT)
Dept: LAB | Facility: HOSPITAL | Age: 28
End: 2023-05-25
Payer: OTHER MISCELLANEOUS

## 2023-05-25 ENCOUNTER — HOSPITAL ENCOUNTER (OUTPATIENT)
Dept: GENERAL RADIOLOGY | Facility: HOSPITAL | Age: 28
Discharge: HOME | End: 2023-05-25
Payer: OTHER MISCELLANEOUS

## 2023-05-25 DIAGNOSIS — M54.50 LOW BACK PAIN, UNSPECIFIED BACK PAIN LATERALITY, UNSPECIFIED CHRONICITY, UNSPECIFIED WHETHER SCIATICA PRESENT: ICD-10-CM

## 2023-05-25 DIAGNOSIS — M54.50 LOW BACK PAIN, UNSPECIFIED BACK PAIN LATERALITY, UNSPECIFIED CHRONICITY, UNSPECIFIED WHETHER SCIATICA PRESENT: Primary | ICD-10-CM

## 2023-05-25 LAB — B-HCG UR QL: NEGATIVE

## 2023-05-25 PROCEDURE — 81025 URINE PREGNANCY TEST: CPT

## 2023-05-25 PROCEDURE — 76775 US EXAM ABDO BACK WALL LIM: CPT

## 2023-05-25 PROCEDURE — 72100 X-RAY EXAM L-S SPINE 2/3 VWS: CPT

## 2023-08-18 ENCOUNTER — LAB (OUTPATIENT)
Dept: LAB | Facility: HOSPITAL | Age: 28
End: 2023-08-18
Payer: COMMERCIAL

## 2023-08-18 PROCEDURE — 82088 ASSAY OF ALDOSTERONE: CPT | Performed by: INTERNAL MEDICINE

## 2023-08-18 PROCEDURE — 84244 ASSAY OF RENIN: CPT | Performed by: INTERNAL MEDICINE

## 2023-08-18 PROCEDURE — 80053 COMPREHEN METABOLIC PANEL: CPT | Performed by: INTERNAL MEDICINE

## 2023-08-18 PROCEDURE — 85025 COMPLETE CBC W/AUTO DIFF WBC: CPT | Performed by: INTERNAL MEDICINE

## 2023-08-22 ENCOUNTER — TELEMEDICINE (OUTPATIENT)
Dept: ENDOCRINOLOGY | Facility: CLINIC | Age: 28
End: 2023-08-22
Payer: COMMERCIAL

## 2023-08-22 DIAGNOSIS — E26.9 HIGH ALDOSTERONE: Primary | ICD-10-CM

## 2023-08-22 DIAGNOSIS — N93.8 DUB (DYSFUNCTIONAL UTERINE BLEEDING): ICD-10-CM

## 2023-08-22 NOTE — PROGRESS NOTES
CC, elevated aldosterone                                         This was a Telehealth Encounter. Benefits and Disadvantages of a Telehealth Visit were discussed and accepted by patient.  Mode of Visit: Video  Location of patient: Home  You have chosen to receive care through a telehealth visit.  Does the patient consent to use a video/audio connection for your medical care today? Yes  The visit included audio and video interaction. No technical issues occurred during this visit          History of Present Illness    27 y.o. female   Admitted with hypokalemia and HTN in the context of taking 50 mg of chlorthalidone   She was found to have aldosterone elevation :    On June 5, 2023 her aldosterone was 19.7, renin 0.8 and ratio of 24 with a potassium of 3.9, sodium 142, creatinine 0.8    She was started on aldactone 12.5 mg daily and KDur 10 meq daily and her K has nlized and blood pressure is normal but I have stopped    Off treatment , her K and BP are normal   But aldosterone / renin is more than 20 and 24 h urine aldosterone is more than 12 with sodium 200     Her main complaint is fatigue.     ==========================================  Physical Exam  There were no vitals taken for this visit.  AOx3  No visible goiter  Normal respiratory effort  No Edema     ==========================================    Laboratory Workup    Lab Results   Component Value Date    WBC 9.13 08/18/2023    HGB 14.6 08/18/2023    HCT 43.3 08/18/2023    MCV 92.5 08/18/2023     08/18/2023       Lab Results   Component Value Date    GLUCOSE 106 (H) 08/18/2023    BUN 11 08/18/2023    CREATININE 0.98 08/18/2023    EGFR 81.3 08/18/2023    BCR 11.2 08/18/2023     08/18/2023    K 3.7 08/18/2023     08/18/2023    CO2 26.0 08/18/2023    CALCIUM 9.1 08/18/2023    ANIONGAP 11.0 08/18/2023    ALBUMIN 4.1 08/18/2023    AST 15 08/18/2023    ALT 17 08/18/2023         Lab Results   Component Value Date    EGFR 81.3 08/18/2023    EGFR  102.2 06/30/2023         ==========================================      ICD-10-CM ICD-9-CM   1. High aldosterone  E26.9 255.10   2. DUB (dysfunctional uterine bleeding)  N93.8 626.8   3. Primary aldosteronism  E26.09 255.10   4. Essential hypertension  I10 401.9        primary aldosteronism     Repeat electrolytes  off treatment , normal   Repeat BP now elevating     SBP, to 120 -130 ,now 140sa   DBP, 70-80s and sometimes 90s     Her low K was most likely due to 50 mg of chlorthalidone that she has now stopped but hypertensive and proof of hyperaldosteronism     Obtain CT scan adrenal           Glucose assessment is normal    Thyroid is normal   ACTH , cortisol are normal     DUB , on implanon , this is better managed by gynecology     Reassurance       No orders of the defined types were placed in this encounter.    No orders of the defined types were placed in this encounter.              This document has been electronically signed by Flavio Chow MD on September 1, 2023 09:04 CDT

## 2023-08-23 ENCOUNTER — LAB (OUTPATIENT)
Dept: LAB | Facility: HOSPITAL | Age: 28
End: 2023-08-23
Payer: COMMERCIAL

## 2023-08-23 PROCEDURE — 82088 ASSAY OF ALDOSTERONE: CPT | Performed by: INTERNAL MEDICINE

## 2023-08-23 PROCEDURE — 81050 URINALYSIS VOLUME MEASURE: CPT | Performed by: INTERNAL MEDICINE

## 2023-09-17 ENCOUNTER — TRANSCRIBE ORDERS (OUTPATIENT)
Dept: CT IMAGING | Facility: HOSPITAL | Age: 28
End: 2023-09-17
Payer: COMMERCIAL

## 2023-09-17 DIAGNOSIS — E26.09 PRIMARY ALDOSTERONISM: Primary | ICD-10-CM

## 2023-09-22 ENCOUNTER — HOSPITAL ENCOUNTER (OUTPATIENT)
Dept: CT IMAGING | Facility: HOSPITAL | Age: 28
Discharge: HOME | End: 2023-09-22
Payer: COMMERCIAL

## 2023-09-22 ENCOUNTER — LAB (OUTPATIENT)
Dept: LAB | Facility: HOSPITAL | Age: 28
End: 2023-09-22
Payer: COMMERCIAL

## 2023-09-22 DIAGNOSIS — E26.09 PRIMARY ALDOSTERONISM: ICD-10-CM

## 2023-09-22 LAB — B-HCG UR QL: NEGATIVE

## 2023-09-22 PROCEDURE — 81025 URINE PREGNANCY TEST: CPT

## 2023-09-22 PROCEDURE — 25510000001 IOPAMIDOL 61 % SOLUTION: Performed by: INTERNAL MEDICINE

## 2023-09-22 PROCEDURE — 74170 CT ABD WO CNTRST FLWD CNTRST: CPT

## 2023-09-22 RX ADMIN — IOPAMIDOL 90 ML: 612 INJECTION, SOLUTION INTRAVENOUS at 14:17

## 2023-09-26 ENCOUNTER — TELEMEDICINE (OUTPATIENT)
Dept: ENDOCRINOLOGY | Facility: CLINIC | Age: 28
End: 2023-09-26
Payer: COMMERCIAL

## 2023-09-26 DIAGNOSIS — E26.9 HIGH ALDOSTERONE: Primary | ICD-10-CM

## 2023-09-26 DIAGNOSIS — N93.8 DUB (DYSFUNCTIONAL UTERINE BLEEDING): ICD-10-CM

## 2023-09-26 PROCEDURE — 99214 OFFICE O/P EST MOD 30 MIN: CPT | Performed by: INTERNAL MEDICINE

## 2023-09-26 NOTE — PROGRESS NOTES
CC, elevated aldosterone                                         This was a Telehealth Encounter. Benefits and Disadvantages of a Telehealth Visit were discussed and accepted by patient.  Mode of Visit: Video  Location of patient: Home  You have chosen to receive care through a telehealth visit.  Does the patient consent to use a video/audio connection for your medical care today? Yes  The visit included audio and video interaction. No technical issues occurred during this visit          History of Present Illness    27 y.o. female   Admitted with hypokalemia and HTN in the context of taking 50 mg of chlorthalidone   She was found to have aldosterone elevation :    On June 5, 2023 her aldosterone was 19.7, renin 0.8 and ratio of 24 with a potassium of 3.9, sodium 142, creatinine 0.8    She was started on aldactone 12.5 mg daily and KDur 10 meq daily and her K has nlized and blood pressure is normal but I have stopped    Off treatment , her K and BP are normal   But aldosterone / renin is more than 20 and 24 h urine aldosterone is more than 12 with sodium 200     Her main complaint is fatigue.           ==========================================  Physical Exam  There were no vitals taken for this visit.  AOx3  No visible goiter  Normal respiratory effort  No Edema     ==========================================    Laboratory Workup    Lab Results   Component Value Date    WBC 9.13 08/18/2023    HGB 14.6 08/18/2023    HCT 43.3 08/18/2023    MCV 92.5 08/18/2023     08/18/2023       Lab Results   Component Value Date    GLUCOSE 106 (H) 08/18/2023    BUN 11 08/18/2023    CREATININE 0.98 08/18/2023    EGFR 81.3 08/18/2023    BCR 11.2 08/18/2023     08/18/2023    K 3.7 08/18/2023     08/18/2023    CO2 26.0 08/18/2023    CALCIUM 9.1 08/18/2023    ANIONGAP 11.0 08/18/2023    ALBUMIN 4.1 08/18/2023    AST 15 08/18/2023    ALT 17 08/18/2023         Lab Results   Component Value Date    EGFR 81.3 08/18/2023     EGFR 102.2 06/30/2023 Sept 2023             ==========================================      ICD-10-CM ICD-9-CM   1. High aldosterone  E26.9 255.10   2. DUB (dysfunctional uterine bleeding)  N93.8 626.8          primary aldosteronism     Repeat electrolytes  off treatment , normal   Repeat BP normal        Her low K was most likely due to 50 mg of chlorthalidone that she has now stopped but hypertensive and proof of hyperaldosteronism     CT scan adrenal is normal from Sept 2023           Glucose assessment is normal    Thyroid is normal   ACTH , cortisol are normal     DUB , on implanon , this is better managed by gynecology     Reassurance but reach out to me if she develops HTN       No orders of the defined types were placed in this encounter.    No orders of the defined types were placed in this encounter.              This document has been electronically signed by Flavio Chow MD on September 26, 2023 13:17 CDT

## 2025-01-08 ENCOUNTER — TRANSCRIBE ORDERS (OUTPATIENT)
Dept: ADMINISTRATIVE | Facility: HOSPITAL | Age: 30
End: 2025-01-08
Payer: COMMERCIAL

## 2025-01-08 DIAGNOSIS — K21.9 GASTROESOPHAGEAL REFLUX DISEASE WITHOUT ESOPHAGITIS: Primary | ICD-10-CM

## 2025-01-22 ENCOUNTER — HOSPITAL ENCOUNTER (OUTPATIENT)
Dept: GENERAL RADIOLOGY | Facility: HOSPITAL | Age: 30
Discharge: HOME | End: 2025-01-22
Payer: COMMERCIAL

## 2025-01-22 DIAGNOSIS — K21.9 GASTROESOPHAGEAL REFLUX DISEASE WITHOUT ESOPHAGITIS: ICD-10-CM

## 2025-01-22 PROCEDURE — 74220 X-RAY XM ESOPHAGUS 1CNTRST: CPT

## 2025-01-22 RX ADMIN — BARIUM SULFATE 700 MG: 700 TABLET ORAL at 10:46

## 2025-01-22 RX ADMIN — BARIUM SULFATE 55 ML: 980 POWDER, FOR SUSPENSION ORAL at 10:46

## 2025-01-22 RX ADMIN — ANTACID/ANTIFLATULENT 1 PACKET: 380; 550; 10; 10 GRANULE, EFFERVESCENT ORAL at 10:46
